# Patient Record
Sex: MALE | Race: WHITE | NOT HISPANIC OR LATINO | URBAN - METROPOLITAN AREA
[De-identification: names, ages, dates, MRNs, and addresses within clinical notes are randomized per-mention and may not be internally consistent; named-entity substitution may affect disease eponyms.]

---

## 2019-02-09 ENCOUNTER — EMERGENCY (EMERGENCY)
Facility: HOSPITAL | Age: 24
LOS: 1 days | Discharge: HOME | End: 2019-02-09
Attending: EMERGENCY MEDICINE | Admitting: EMERGENCY MEDICINE
Payer: COMMERCIAL

## 2019-02-09 VITALS
TEMPERATURE: 98 F | DIASTOLIC BLOOD PRESSURE: 77 MMHG | OXYGEN SATURATION: 99 % | HEART RATE: 110 BPM | RESPIRATION RATE: 15 BRPM | SYSTOLIC BLOOD PRESSURE: 152 MMHG

## 2019-02-09 PROCEDURE — 99284 EMERGENCY DEPT VISIT MOD MDM: CPT | Mod: 25
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
## 2019-02-09 NOTE — ED ADULT TRIAGE NOTE - AS O2 DELIVERY
Airway patent, TM normal bilaterally, normal appearing mouth, nose, throat, neck supple with full range of motion, no cervical adenopathy. room air

## 2019-02-10 VITALS
TEMPERATURE: 98 F | RESPIRATION RATE: 18 BRPM | SYSTOLIC BLOOD PRESSURE: 129 MMHG | HEART RATE: 88 BPM | DIASTOLIC BLOOD PRESSURE: 78 MMHG | OXYGEN SATURATION: 100 %
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
## 2019-02-10 NOTE — ED PROVIDER NOTE - NSFOLLOWUPINSTRUCTIONS_ED_ALL_ED_FT
You were seen for heroin overdose, and responded to narcan. You should avoid using drugs, especially avoid driving while intoxicated.
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
## 2019-02-10 NOTE — ED ADULT NURSE NOTE - NSIMPLEMENTINTERV_GEN_ALL_ED
Implemented All Universal Safety Interventions:  [place] to call system. Call bell, personal items and telephone within reach. Instruct patient to call for assistance. Room bathroom lighting operational. Non-slip footwear when patient is off stretcher. Physically safe environment: no spills, clutter or unnecessary equipment. Stretcher in lowest position, wheels locked, appropriate side rails in place.

## 2019-10-24 ENCOUNTER — EMERGENCY (EMERGENCY)
Facility: HOSPITAL | Age: 24
LOS: 1 days | Discharge: HOME | End: 2019-10-24
Attending: EMERGENCY MEDICINE | Admitting: EMERGENCY MEDICINE
Payer: COMMERCIAL

## 2019-10-24 VITALS
DIASTOLIC BLOOD PRESSURE: 95 MMHG | SYSTOLIC BLOOD PRESSURE: 141 MMHG | HEART RATE: 98 BPM | OXYGEN SATURATION: 100 % | RESPIRATION RATE: 16 BRPM | TEMPERATURE: 98 F

## 2019-10-24 LAB
ALBUMIN SERPL ELPH-MCNC: 4.7 G/DL — SIGNIFICANT CHANGE UP (ref 3.3–5)
ALP SERPL-CCNC: 67 U/L — SIGNIFICANT CHANGE UP (ref 40–120)
ALT FLD-CCNC: 18 U/L — SIGNIFICANT CHANGE UP (ref 4–41)
AMPHET UR-MCNC: NEGATIVE — SIGNIFICANT CHANGE UP
ANION GAP SERPL CALC-SCNC: 11 MMO/L — SIGNIFICANT CHANGE UP (ref 7–14)
APAP SERPL-MCNC: < 15 UG/ML — LOW (ref 15–25)
APPEARANCE UR: CLEAR — SIGNIFICANT CHANGE UP
APTT BLD: 28.6 SEC — SIGNIFICANT CHANGE UP (ref 27.5–36.3)
AST SERPL-CCNC: 23 U/L — SIGNIFICANT CHANGE UP (ref 4–40)
BARBITURATES UR SCN-MCNC: NEGATIVE — SIGNIFICANT CHANGE UP
BASOPHILS # BLD AUTO: 0.05 K/UL — SIGNIFICANT CHANGE UP (ref 0–0.2)
BASOPHILS NFR BLD AUTO: 0.7 % — SIGNIFICANT CHANGE UP (ref 0–2)
BENZODIAZ UR-MCNC: NEGATIVE — SIGNIFICANT CHANGE UP
BILIRUB SERPL-MCNC: 0.2 MG/DL — SIGNIFICANT CHANGE UP (ref 0.2–1.2)
BILIRUB UR-MCNC: NEGATIVE — SIGNIFICANT CHANGE UP
BLOOD UR QL VISUAL: NEGATIVE — SIGNIFICANT CHANGE UP
BUN SERPL-MCNC: 12 MG/DL — SIGNIFICANT CHANGE UP (ref 7–23)
CALCIUM SERPL-MCNC: 9.5 MG/DL — SIGNIFICANT CHANGE UP (ref 8.4–10.5)
CANNABINOIDS UR-MCNC: NEGATIVE — SIGNIFICANT CHANGE UP
CHLORIDE SERPL-SCNC: 106 MMOL/L — SIGNIFICANT CHANGE UP (ref 98–107)
CO2 SERPL-SCNC: 22 MMOL/L — SIGNIFICANT CHANGE UP (ref 22–31)
COCAINE METAB.OTHER UR-MCNC: POSITIVE — SIGNIFICANT CHANGE UP
COLOR SPEC: SIGNIFICANT CHANGE UP
CREAT SERPL-MCNC: 0.97 MG/DL — SIGNIFICANT CHANGE UP (ref 0.5–1.3)
EOSINOPHIL # BLD AUTO: 0.28 K/UL — SIGNIFICANT CHANGE UP (ref 0–0.5)
EOSINOPHIL NFR BLD AUTO: 3.9 % — SIGNIFICANT CHANGE UP (ref 0–6)
ETHANOL BLD-MCNC: < 10 MG/DL — SIGNIFICANT CHANGE UP
GLUCOSE SERPL-MCNC: 112 MG/DL — HIGH (ref 70–99)
GLUCOSE UR-MCNC: NEGATIVE — SIGNIFICANT CHANGE UP
HCT VFR BLD CALC: 43.2 % — SIGNIFICANT CHANGE UP (ref 39–50)
HGB BLD-MCNC: 14.6 G/DL — SIGNIFICANT CHANGE UP (ref 13–17)
IMM GRANULOCYTES NFR BLD AUTO: 0.1 % — SIGNIFICANT CHANGE UP (ref 0–1.5)
INR BLD: 1.03 — SIGNIFICANT CHANGE UP (ref 0.88–1.17)
KETONES UR-MCNC: NEGATIVE — SIGNIFICANT CHANGE UP
LEUKOCYTE ESTERASE UR-ACNC: NEGATIVE — SIGNIFICANT CHANGE UP
LYMPHOCYTES # BLD AUTO: 2.17 K/UL — SIGNIFICANT CHANGE UP (ref 1–3.3)
LYMPHOCYTES # BLD AUTO: 30.2 % — SIGNIFICANT CHANGE UP (ref 13–44)
MCHC RBC-ENTMCNC: 30.6 PG — SIGNIFICANT CHANGE UP (ref 27–34)
MCHC RBC-ENTMCNC: 33.8 % — SIGNIFICANT CHANGE UP (ref 32–36)
MCV RBC AUTO: 90.6 FL — SIGNIFICANT CHANGE UP (ref 80–100)
METHADONE UR-MCNC: NEGATIVE — SIGNIFICANT CHANGE UP
MONOCYTES # BLD AUTO: 1 K/UL — HIGH (ref 0–0.9)
MONOCYTES NFR BLD AUTO: 13.9 % — SIGNIFICANT CHANGE UP (ref 2–14)
NEUTROPHILS # BLD AUTO: 3.68 K/UL — SIGNIFICANT CHANGE UP (ref 1.8–7.4)
NEUTROPHILS NFR BLD AUTO: 51.2 % — SIGNIFICANT CHANGE UP (ref 43–77)
NITRITE UR-MCNC: NEGATIVE — SIGNIFICANT CHANGE UP
NRBC # FLD: 0 K/UL — SIGNIFICANT CHANGE UP (ref 0–0)
OPIATES UR-MCNC: POSITIVE — SIGNIFICANT CHANGE UP
OXYCODONE UR-MCNC: NEGATIVE — SIGNIFICANT CHANGE UP
PCP UR-MCNC: POSITIVE — SIGNIFICANT CHANGE UP
PH UR: 6.5 — SIGNIFICANT CHANGE UP (ref 5–8)
PLATELET # BLD AUTO: 221 K/UL — SIGNIFICANT CHANGE UP (ref 150–400)
PMV BLD: 11.2 FL — SIGNIFICANT CHANGE UP (ref 7–13)
POTASSIUM SERPL-MCNC: 4.1 MMOL/L — SIGNIFICANT CHANGE UP (ref 3.5–5.3)
POTASSIUM SERPL-SCNC: 4.1 MMOL/L — SIGNIFICANT CHANGE UP (ref 3.5–5.3)
PROT SERPL-MCNC: 7.1 G/DL — SIGNIFICANT CHANGE UP (ref 6–8.3)
PROT UR-MCNC: 10 — SIGNIFICANT CHANGE UP
PROTHROM AB SERPL-ACNC: 11.7 SEC — SIGNIFICANT CHANGE UP (ref 9.8–13.1)
RBC # BLD: 4.77 M/UL — SIGNIFICANT CHANGE UP (ref 4.2–5.8)
RBC # FLD: 12.2 % — SIGNIFICANT CHANGE UP (ref 10.3–14.5)
SALICYLATES SERPL-MCNC: < 5 MG/DL — LOW (ref 15–30)
SODIUM SERPL-SCNC: 139 MMOL/L — SIGNIFICANT CHANGE UP (ref 135–145)
SP GR SPEC: 1.01 — SIGNIFICANT CHANGE UP (ref 1–1.04)
UROBILINOGEN FLD QL: NORMAL — SIGNIFICANT CHANGE UP
WBC # BLD: 7.19 K/UL — SIGNIFICANT CHANGE UP (ref 3.8–10.5)
WBC # FLD AUTO: 7.19 K/UL — SIGNIFICANT CHANGE UP (ref 3.8–10.5)

## 2019-10-24 PROCEDURE — 99283 EMERGENCY DEPT VISIT LOW MDM: CPT

## 2019-10-24 NOTE — ED PROVIDER NOTE - OBJECTIVE STATEMENT
24 y.o. male hx of substance abuse (PCP, crack, cocaine, heroin 24 y.o. male hx of substance abuse (PCP, crack, cocaine, heroin) presenting to the ED seeking detox. Patient last smoked cocaine today at 7am and 3pm. Last used heroin yesterday. PCP was used 6 days ago for 4 consecutive days, nothing in last 2 days. Last drink was today, 1/2 beer. Smoked 40 cigarettes today. Denies current SI/HI (but has SI in the past, about 1 month ago), visual, auditory, tactile hallucinations, tremors, sweating. Patient is here with family and friends. As per mom, patient does not anyone to know about the PCP use and that patient needed a vivitrol injection on 9/28 by Dr. Juan Dick. As per mom, patient has been appearing manic lately with increased activity, staying awake late at night working on projects, celebrating. Patient denies current headache, visual changes, chest pain, shortness of breath, abdominal pain, nausea, vomiting, diarrhea.

## 2019-10-24 NOTE — ED PROVIDER NOTE - NSFOLLOWUPINSTRUCTIONS_ED_ALL_ED_FT
-You were seen in the Emergency Department (ED) for DETOX. Lab and imaging results, if performed, were discussed with you along with your discharge diagnosis.    MEDICATIONS:  -Continue all other prescribed medicine, IF ANY, as per your primary care doctor's (PMD) recommendations.    PAIN CONTROL:  -Please take over the counter Tylenol (also known as acetaminophen) 650mg every 6 hours or Ibuprofen (also known as motrin, advil) 600mg every 8 hour for your pain, IF ANY, unless you are not supposed to for any reason.  -Rest, stay hydrated with plenty of fluids (drink at least 2 Liters or 64 Ounces of water each day UNLESS you are supposed to restrict fluids or ANY reason.    FOLLOW-UP:  -DETOX information has been provided.  -Please follow up with your private physician within the next 72 hours. Tell them you were recently in the ED for an urgent issue and would like to be seen. Bring copies of your results if you were given.   -If you do not have a PMD, please call [phone] to find one convenient for you or call our clinic at [phone].    RETURN PRECAUTIONS:  -Please return to the Emergency Department if you experience ANY new or concerning symptoms, such as, but not limited to: worsening pain, large amount of bleeding, passing out, fever >100.4F, shaking chills, inability to see or new double vision, chest pain, difficulty breathing, diffuse abdominal pain, unable to eat or drink, continuous vomiting or diarrhea, unable to move or feel part of your body      Thank you for choosing a [facility] [place] ED.

## 2019-10-24 NOTE — ED ADULT NURSE NOTE - OBJECTIVE STATEMENT
Receive pt. in room 6 alert and oriented x 4 presenting to the ER for drug abuse. Pt. have a history of substance abuse and stated " I did cocaine and heroin today and I want to stop I want to be clean. Pt. is calm and cooperative , labs sent. No c/o pain , no nausea, no vomiting.

## 2019-10-24 NOTE — ED ADULT TRIAGE NOTE - CHIEF COMPLAINT QUOTE
Pt. intoxicated secondary to cocaine use earlier today. Admits to drinking half a beer before coming. Pt. seeking help. h/o SI but denies SI/HI/hallucinations at this time. h/o withdrawals and ICU admission in 2016. Denies cp, sob, n/v at this time.

## 2019-10-24 NOTE — ED PROVIDER NOTE - NS ED ROS FT
CONSTITUTIONAL: No fevers, chills, fatigue, dizziness, weakness  EYES: No loss of vision, double vision, blurry vision  CV: No chest pain, palpitations  PULM: No cough, shortness of breath  GI: No abdominal pain, nausea, vomiting, diarrhea, constipation  : No dysuria  SKIN: No rashes, swelling, sweating  NEURO: no headache, numbness, tingling  PSYCHIATRIC: Denies suicidal, homicidal ideations. No auditory, visual, tactile, hallucinations.

## 2019-10-24 NOTE — ED ADULT NURSE NOTE - NSIMPLEMENTINTERV_GEN_ALL_ED
Implemented All Universal Safety Interventions:  [place] to call system. Call bell, personal items and telephone within reach. Instruct patient to call for assistance. Room bathroom lighting operational. Non-slip footwear when patient is off stretcher. Physically safe environment: no spills, clutter or unnecessary equipment. Stretcher in lowest position, wheels locked, appropriate side rails in place.

## 2019-10-24 NOTE — ED PROVIDER NOTE - CLINICAL SUMMARY MEDICAL DECISION MAKING FREE TEXT BOX
24 y.o. male hx of substance abuse with cocaine last used today and heroin last used yesterday, currently no symptoms, denies SI/HI, hallucinations, here seeking detox. Will get labs including tox panels, ekg, and consult SBIRT.

## 2019-10-24 NOTE — ED PROVIDER NOTE - PROGRESS NOTE DETAILS
Ramy Jones D.O., PGY1 (Resident)  Consulted SBIRT. Awaiting for call back pt given referrals by SW. pt not SI or HI. Pt  refused repeat vitals. will dc home. Ramy Jones D.O., PGY1 (Resident)  Asked by SW to speak to patient as family and friends at bedside (one male, one female) said a physician never spoke to them. Myself and the attending both spoke to the patient. Family and male friend were critical of the patient's decision to seek outpatient detox and were being hostile towards myself and the KAMERON [place]gan. After male friend began cursing, SW asked friend to leave. Mother at bedside wished for our kids to undergo the same torment she has been going through. Patient desired to seek outpatient detox and supported our shared-decision to provide information. Patient did not endorse wanting inpatient admission or detox. Patient denied any suicidal or homicidal ideations, hallucinations (auditory, visual, tactile, or somatic) and was thankful to the team for supporting his decision to seek outpatient detox. Strict return precautions were given to the patient if he developed any hallucinations at all or had any suicidal or homicidal ideations. Patient endorsed feeling safe and was agreeable to discharge.

## 2019-10-24 NOTE — ED PROVIDER NOTE - PATIENT PORTAL LINK FT
You can access the FollowMyHealth Patient Portal offered by [facility] by registering at the following website: [email or web address]. By joining [employer]’s FollowMyHealth portal, you will also be able to view your health information using other applications (apps) compatible with our system.

## 2019-10-24 NOTE — PROVIDER CONTACT NOTE (OTHER) - ASSESSMENT
Pt is here for Detox from Drugs. Met with pt, his mother, and 2 friends, includes a male and female. Pt said he has been to many places and said he has no intention to go to i/p detox. Mother was talking for him stating he has hallucination, but pt denied. MD spoke to him, he denied any SI. Pt asked for list of rehab, provided. Advised pt to call his Insurance to find out the in network provider. Mother and the other people were cursing each other and to provider. Mother said "I wish your children would be at this same condition"

## 2019-10-24 NOTE — ED PROVIDER NOTE - PHYSICAL EXAMINATION
GENERAL: Young male, lying in bed, NAD, normal affect, appears happy. HR 90 on my exam otherwise Vital signs are within normal limits  HEENT: NC/AT, PERRL, EOMI b/l, conjunctiva noninjected and sclera anicteric, moist mucous membranes. No tongue fasiculations  LUNG: CTAB, no w/r/r appreciated, good respiratory effort  CV: RRR, no m/r/g appreciated, Pulses- Radial: 2+ b/l  ABDOMEN: Soft, NTND, no rebound or guarding  NEURO: AAOx4 (to person, place, time, event)  MSK: No tremors  SKIN: Warm, dry, well perfused, no evidence of rash  PSYCH: Normal mood and affect

## 2019-10-24 NOTE — ED PROVIDER NOTE - ATTENDING CONTRIBUTION TO CARE
Attending Statement: I have personally seen and examined this patient. I have fully participated in the care of this patient. I have reviewed all pertinent clinical information, including history physical exam, plan and the Resident's note and agree except as noted  25yo M hx of polysubstance abuse comes in "to get help" States "my mother thought it was a good idea to come to hospital" pt endorse use PCP couple days ago, heroin a day ago and cocaine today w "a little alcohol" Denies SI or HI. Uses drugs to "get high" Denies any auditory or visual hallucinations. Does not feel tremulous at this time. no cp no sob no nausea/vomit or abdominal pain. Pt w mother, lionel, and friends. Interviewed pt in private, pt request information not be shared w them   Vital signs noted. pulse ox 100 RA sitting up, no distress. no sign of head/facial trauma. AO3 no resp distress. thin male nt abdomen.   plan labs, SBIRT, re assess

## 2020-01-15 NOTE — ED ADULT NURSE NOTE - NSFALLRSKUNASSIST_ED_ALL_ED
DRUG ALLERGY TEST SERIES                                CONTRAST MEDIA (iodinated, CT) (Placed 01/15/20 )     No Substance Readings (15min) Evaluation   POS Histamine 1mg/ml +     NEG NaCl 0.9% -            Prick Tests               Substance/ Allergen Concentration Result (15min) Remarks     Iodixanol [Visipaque] (*NID) 320/50 ml -       Iohexol [Omnipaque] (*NIM) 350 mg I/ml -        Intradermal Testing (Placed 01/15/20)     No Substance Conc.  Readings (15min) Evaluation   1 NaCl  0.9% -     2 Histamine  0.1mg / ml ++ 10mmP/15mmE          Intradermal Tests      immediate immediate delayed delayed       Substance Conc 1st dil (15 ) 2nd dil (15 )   days     days   remarks         Iodixanol (*NID)   1:10 neg             Iohexol (*NIM) 1:10 neg           * IM =   Ionic monomer                                   * NIM = Non-ionic monomer           * ID =   Ionic dimer                                         * NID = Non-ionic dimer        DRUG ALLERGY TEST SERIES                                CONTRAST MEDIA (paramagnetic / Sonography)                                                                                                                                                          Prick Tests               Substance/ Allergen Conc Result (15min) Remarks     Gadopentetic acid - Magnevist (GK, Z) 10 ml -            Intradermal Tests      immed immed delayed delayed       Substance Conc 1st dil 2nd dil  days    days   remarks       Gadopentetic acid 1:10 neg                 Impression/Plan:     1) Immediate type reactions with Angioedema,Urticaria and vomiting to radio contrast media 45 years ago     ? In prick and intradermal tests to CT contrast Iodixanol (Visipaque) and Iohexol (Omnipaque) no signs for immediate type sensitization  ? In prick and intradermal test to MRI contrast media Gadopentate (Magnevist no signs for immediate type sensitization  ==> skin tests cannot exclude to 100% a specific immediate  type/intolerance reaction to contrast media. Therefore I would propose following procedure if patient needs CT scan with contrast media  ? Premedication about 3 days prior with systemic Prednisone and antihistamines (e.g. 50mg Prednisone daily and Allegra 180mg 2times daily)   ? Be prepared to treat immediate type reaction during injection of CT contrast media with I.v. access.  --> Maybe use as alternative, is possible MRI with Gadopentate contrast        2)  atopic predisposition with allergic rhinoconjunctivitis   ? Perennial to house dust mites and cat  ? Seasonal in August/september to ragweed  --> prick tests maybe later, but today was priority on contrast media allergy        no

## 2022-04-01 ENCOUNTER — APPOINTMENT (OUTPATIENT)
Dept: HEPATOLOGY | Facility: CLINIC | Age: 27
End: 2022-04-01

## 2023-04-04 ENCOUNTER — OUTPATIENT (OUTPATIENT)
Dept: OUTPATIENT SERVICES | Facility: HOSPITAL | Age: 28
LOS: 1 days | Discharge: HOME | End: 2023-04-04
Payer: COMMERCIAL

## 2023-04-04 PROBLEM — F19.10 OTHER PSYCHOACTIVE SUBSTANCE ABUSE, UNCOMPLICATED: Chronic | Status: ACTIVE | Noted: 2019-10-24

## 2023-04-04 PROCEDURE — 90839 PSYTX CRISIS INITIAL 60 MIN: CPT | Mod: 95

## 2023-04-05 DIAGNOSIS — F11.20 OPIOID DEPENDENCE, UNCOMPLICATED: ICD-10-CM

## 2023-07-17 ENCOUNTER — HOSPITAL ENCOUNTER (INPATIENT)
Dept: HOSPITAL 74 - YASAS | Age: 28
LOS: 1 days | Discharge: LEFT BEFORE DISCHARGE | DRG: 770 | End: 2023-07-18
Attending: SURGERY | Admitting: ALLERGY & IMMUNOLOGY
Payer: COMMERCIAL

## 2023-07-17 VITALS — BODY MASS INDEX: 21.4 KG/M2

## 2023-07-17 DIAGNOSIS — F11.23: Primary | ICD-10-CM

## 2023-07-17 DIAGNOSIS — F14.10: ICD-10-CM

## 2023-07-17 DIAGNOSIS — F17.210: ICD-10-CM

## 2023-07-17 DIAGNOSIS — F10.230: ICD-10-CM

## 2023-07-17 DIAGNOSIS — F19.282: ICD-10-CM

## 2023-07-17 PROCEDURE — HZ2ZZZZ DETOXIFICATION SERVICES FOR SUBSTANCE ABUSE TREATMENT: ICD-10-PCS | Performed by: SURGERY

## 2023-07-17 RX ADMIN — METHOCARBAMOL PRN MG: 500 TABLET ORAL at 23:02

## 2023-07-17 RX ADMIN — HYDROXYZINE PAMOATE PRN MG: 25 CAPSULE ORAL at 23:02

## 2023-07-18 VITALS
HEART RATE: 74 BPM | TEMPERATURE: 98.6 F | SYSTOLIC BLOOD PRESSURE: 114 MMHG | RESPIRATION RATE: 17 BRPM | DIASTOLIC BLOOD PRESSURE: 59 MMHG

## 2023-07-18 LAB
ALBUMIN SERPL-MCNC: 3.7 G/DL (ref 3.4–5)
ALP SERPL-CCNC: 88 U/L (ref 45–117)
ALT SERPL-CCNC: 28 U/L (ref 13–61)
ANION GAP SERPL CALC-SCNC: 6 MMOL/L (ref 8–16)
AST SERPL-CCNC: 19 U/L (ref 15–37)
BILIRUB SERPL-MCNC: 0.2 MG/DL (ref 0.2–1)
BUN SERPL-MCNC: 12 MG/DL (ref 7–18)
CALCIUM SERPL-MCNC: 9.5 MG/DL (ref 8.5–10.1)
CHLORIDE SERPL-SCNC: 106 MMOL/L (ref 98–107)
CO2 SERPL-SCNC: 29 MMOL/L (ref 21–32)
CREAT SERPL-MCNC: 0.9 MG/DL (ref 0.55–1.3)
DEPRECATED RDW RBC AUTO: 14.1 % (ref 11.9–15.9)
GLUCOSE SERPL-MCNC: 94 MG/DL (ref 74–106)
HCT VFR BLD CALC: 43.7 % (ref 35.4–49)
HGB BLD-MCNC: 14.4 GM/DL (ref 11.7–16.9)
MCH RBC QN AUTO: 29.5 PG (ref 25.7–33.7)
MCHC RBC AUTO-ENTMCNC: 32.9 G/DL (ref 32–35.9)
MCV RBC: 89.8 FL (ref 80–96)
PLATELET # BLD AUTO: 253 10^3/UL (ref 134–434)
PMV BLD: 11.2 FL (ref 7.5–11.1)
POTASSIUM SERPLBLD-SCNC: 5.2 MMOL/L (ref 3.5–5.1)
PROT SERPL-MCNC: 7.3 G/DL (ref 6.4–8.2)
RBC # BLD AUTO: 4.87 M/MM3 (ref 4–5.6)
SODIUM SERPL-SCNC: 141 MMOL/L (ref 136–145)
WBC # BLD AUTO: 4.3 K/MM3 (ref 4–10)

## 2023-07-18 RX ADMIN — METHOCARBAMOL PRN MG: 500 TABLET ORAL at 10:07

## 2023-07-18 RX ADMIN — HYDROXYZINE PAMOATE PRN MG: 25 CAPSULE ORAL at 10:07

## 2024-01-31 ENCOUNTER — EMERGENCY (EMERGENCY)
Facility: HOSPITAL | Age: 29
LOS: 1 days | Discharge: HOME | End: 2024-01-31
Attending: EMERGENCY MEDICINE
Payer: MEDICAID

## 2024-01-31 VITALS — HEART RATE: 66 BPM | OXYGEN SATURATION: 99 % | RESPIRATION RATE: 16 BRPM

## 2024-01-31 VITALS
TEMPERATURE: 98 F | HEART RATE: 16 BPM | OXYGEN SATURATION: 99 % | RESPIRATION RATE: 66 BRPM | DIASTOLIC BLOOD PRESSURE: 66 MMHG | SYSTOLIC BLOOD PRESSURE: 128 MMHG | WEIGHT: 179.9 LBS | HEIGHT: 76 IN

## 2024-01-31 PROCEDURE — 99283 EMERGENCY DEPT VISIT LOW MDM: CPT

## 2024-01-31 RX ORDER — BUPRENORPHINE AND NALOXONE 2; .5 MG/1; MG/1
2 TABLET SUBLINGUAL ONCE
Refills: 0 | Status: DISCONTINUED | OUTPATIENT
Start: 2024-01-31 | End: 2024-01-31

## 2024-01-31 RX ADMIN — BUPRENORPHINE AND NALOXONE 2 TABLET(S): 2; .5 TABLET SUBLINGUAL at 13:10

## 2024-01-31 NOTE — ED PROVIDER NOTE - ATTENDING APP SHARED VISIT CONTRIBUTION OF CARE
I, Fabian Gibbs MD, performed the initial face to face bedside interview with this patient regarding history of present illness, review of symptoms and relevant past medical, social and family history.  I completed an independent physical examination.  I was the initial provider who evaluated this patient. I have signed out the follow up of any pending tests (i.e. labs, radiological studies) to the ACP.  I have communicated the patient’s plan of care and disposition with the ACP.

## 2024-01-31 NOTE — ED ADULT NURSE NOTE - DISCHARGE DATE/TIME
Caller: Silvina Rodriguez LYUDMILA    Relationship: Self    Best call back number: [phone]    Requested Prescriptions:   Requested Prescriptions     Pending Prescriptions Disp Refills   • OLANZapine (ZyPREXA) 5 MG tablet 30 tablet 2     Sig: Take 1 tablet by mouth Every Night.   • amLODIPine (NORVASC) 5 MG tablet 30 tablet 2     Sig: Take 1 tablet by mouth Daily.   • atorvastatin (LIPITOR) 10 MG tablet 30 tablet 2     Sig: Take 1 tablet by mouth Daily.   • Cholecalciferol (Vitamin D) 50 MCG (2000 UT) capsule 30 capsule 2     Sig: Take 1 capsule by mouth Daily.   • sertraline (Zoloft) 50 MG tablet 30 tablet 2     Sig: Take 1 tablet by mouth Daily.   • cyproheptadine (PERIACTIN) 4 MG tablet 90 tablet 2     Sig: Take 1 tablet by mouth 3 (Three) Times a Day.        Pharmacy where request should be sent: [facility], [address] - [phone] [facility] [phone] FX     Additional details provided by patient: LESS THAN 3 DAYS SUPPLY    Does the patient have less than a 3 day supply:  [x] Yes  [] No    Chance Rabago Rep   11/14/22 12:19 CST           
31-Jan-2024 13:16

## 2024-01-31 NOTE — ED PROVIDER NOTE - PHYSICAL EXAMINATION
Gen: No acute distress, non toxic  Head: NCAT  Eyes: pink conjunctivae, EOMI, PERRL  CV: well perfused  Resp: no resp distress  Neuro: A&O x 3  MSK: Full ROM ext x 4  Skin: No rashes. intact and perfused.   Psych: Calm, cooperative

## 2024-01-31 NOTE — ED PROVIDER NOTE - OBJECTIVE STATEMENT
29yo M PMHx substance abuse presents to ED requesting dose of Zubsolv. States he ran out of medication 2 days ago and is requesting an emergency dose. States he is setting up his appointment currently to have prescription refilled but will be unable to have filled until tomorrow. No other complaints. ISTOP reference #[id number] last filled 11/13/23 60 tabs of zubsolv 5.7 - 1.4mg rx'd by Stephane Asencio. Takes 2.5 tablets daily but states if dosing is difficulty for 1 time dose he is also okay with suboxone equivalent of 2 tablets since that was dosing he was previously taking.  Denies fever, headache, dizziness, cp, sob, SI/HI, hallucinators, rhinorrhea, diarrhea.

## 2024-01-31 NOTE — ED PROVIDER NOTE - CLINICAL SUMMARY MEDICAL DECISION MAKING FREE TEXT BOX
29yo M PMHx substance abuse presents to ED requesting dose of Zubsolv. States he ran out of medication 2 days ago and is requesting an emergency dose. ISTOP reference #[id number] last filled 11/13/23 60 tabs of zubsolv 5.7 - 1.4mg rx'd by Stephane Asencio. confirmed with pharmacist 5.7-1.4mg zubsolv equivalent to suboxone 8mg/2mg. pt agreeable to get suboxone instead. provided 1 dose in ED and encourage to f/u with his prescribing doctor

## 2024-01-31 NOTE — ED PROVIDER NOTE - PATIENT PORTAL LINK FT
You can access the FollowMyHealth Patient Portal offered by [facility] by registering at the following website: [email or web address]. By joining [employer]’s FollowMyHealth portal, you will also be able to view your health information using other applications (apps) compatible with our system.